# Patient Record
Sex: MALE | Race: WHITE | NOT HISPANIC OR LATINO | URBAN - METROPOLITAN AREA
[De-identification: names, ages, dates, MRNs, and addresses within clinical notes are randomized per-mention and may not be internally consistent; named-entity substitution may affect disease eponyms.]

---

## 2022-12-23 ENCOUNTER — EMERGENCY (EMERGENCY)
Facility: HOSPITAL | Age: 67
LOS: 0 days | Discharge: HOME | End: 2022-12-23
Attending: EMERGENCY MEDICINE | Admitting: EMERGENCY MEDICINE
Payer: MEDICARE

## 2022-12-23 VITALS — SYSTOLIC BLOOD PRESSURE: 160 MMHG | DIASTOLIC BLOOD PRESSURE: 93 MMHG

## 2022-12-23 VITALS
DIASTOLIC BLOOD PRESSURE: 101 MMHG | OXYGEN SATURATION: 97 % | WEIGHT: 179.9 LBS | SYSTOLIC BLOOD PRESSURE: 162 MMHG | RESPIRATION RATE: 16 BRPM | HEART RATE: 87 BPM | TEMPERATURE: 98 F

## 2022-12-23 DIAGNOSIS — Z23 ENCOUNTER FOR IMMUNIZATION: ICD-10-CM
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
## 2022-12-23 DIAGNOSIS — S63.280A DISLOCATION OF PROXIMAL INTERPHALANGEAL JOINT OF RIGHT INDEX FINGER, INITIAL ENCOUNTER: ICD-10-CM

## 2022-12-23 DIAGNOSIS — E78.5 HYPERLIPIDEMIA, UNSPECIFIED: ICD-10-CM
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
## 2022-12-23 DIAGNOSIS — M79.644 PAIN IN RIGHT FINGER(S): ICD-10-CM

## 2022-12-23 DIAGNOSIS — I10 ESSENTIAL (PRIMARY) HYPERTENSION: ICD-10-CM

## 2022-12-23 PROCEDURE — 73120 X-RAY EXAM OF HAND: CPT | Mod: 26,59,RT

## 2022-12-23 PROCEDURE — 12001 RPR S/N/AX/GEN/TRNK 2.5CM/<: CPT

## 2022-12-23 PROCEDURE — 99283 EMERGENCY DEPT VISIT LOW MDM: CPT | Mod: 25

## 2022-12-23 PROCEDURE — 73130 X-RAY EXAM OF HAND: CPT | Mod: 26,RT

## 2022-12-23 RX ORDER — CEPHALEXIN 500 MG
1 CAPSULE ORAL
Qty: 40 | Refills: 0
Start: 2022-12-23 | End: 2023-01-01

## 2022-12-23 RX ORDER — IBUPROFEN 200 MG
600 TABLET ORAL ONCE
Refills: 0 | Status: COMPLETED | OUTPATIENT
Start: 2022-12-23 | End: 2022-12-23

## 2022-12-23 RX ORDER — TETANUS TOXOID, REDUCED DIPHTHERIA TOXOID AND ACELLULAR PERTUSSIS VACCINE, ADSORBED 5; 2.5; 8; 8; 2.5 [IU]/.5ML; [IU]/.5ML; UG/.5ML; UG/.5ML; UG/.5ML
0.5 SUSPENSION INTRAMUSCULAR ONCE
Refills: 0 | Status: COMPLETED | OUTPATIENT
Start: 2022-12-23 | End: 2022-12-23

## 2022-12-23 RX ADMIN — Medication 600 MILLIGRAM(S): at 13:38

## 2022-12-23 RX ADMIN — TETANUS TOXOID, REDUCED DIPHTHERIA TOXOID AND ACELLULAR PERTUSSIS VACCINE, ADSORBED 0.5 MILLILITER(S): 5; 2.5; 8; 8; 2.5 SUSPENSION INTRAMUSCULAR at 12:30

## 2022-12-23 NOTE — ED PROVIDER NOTE - PROGRESS NOTE DETAILS
ED Attending ADWOA Reeves noted, wound being cleansed, will reduce dislocation as well after pain controlled, pt with no pain to MCP, no foreign bodies. will continue to monitor and reassess. Patient is a good candidate to attempt outpatient management. Supportive care and home care discussed in detail. Patient aware they may have to return for re-evaluation and possible admission if outpatient treatment fails. Strict return precautions discussed.

## 2022-12-23 NOTE — ED PROVIDER NOTE - CLINICAL SUMMARY MEDICAL DECISION MAKING FREE TEXT BOX
67-year-old male with past medical history of high blood pressure, hyperlipidemia, not on anticoagulation, presents with right second finger laceration after he tripped on a step and fell on his right hand, no other trauma.  Unsure of last tetanus.  Patient reports mild throbbing pain to the right second phalanx, worse with movement, better at rest, nonradiating, associated with decreased range of motion and a laceration.    No fever, chills, nausea, vomiting, numbness/tingling, decreased sensation, discharge, hearing a click/feeling a pop, or any trauma. Status post reduction of finger, tolerated well, wound cleansed extensively, sutures placed, antibiotics given.  Patient aware to follow-up with Ortho.  Signs and symptoms to return for.   will follow-up as discussed.  Patient is a good candidate to attempt outpatient management. Supportive care and home care discussed in detail. Patient aware they may have to return for re-evaluation and possible admission if outpatient treatment fails. Strict return precautions discussed.

## 2022-12-23 NOTE — ED PROVIDER NOTE - CARE PROVIDER_API CALL
Dhaval Rader)  Orthopaedic Surgery  [address] [place] [address]  Phone: [phone]  Fax: [phone]  Follow Up Time:

## 2022-12-23 NOTE — ED PROVIDER NOTE - PATIENT PORTAL LINK FT
You can access the FollowMyHealth Patient Portal offered by [facility] by registering at the following website: [email or web address]. By joining [employer]’s FollowMyHealth portal, you will also be able to view your health information using other applications (apps) compatible with our system.

## 2022-12-23 NOTE — ED PROVIDER NOTE - NSFOLLOWUPINSTRUCTIONS_ED_ALL_ED_FT
Follow up with PMD and Orthopedics in 1-2 days.    Dislocation    A dislocation is a displacement of the bones that form a joint. This can result from trauma or due to a previous weakening of that joint. Symptoms include pain, swelling, and deformity at the site. Your health care provider has performed maneuvers to place the bones back in place. If a splint or brace was applied, make sure to wear it until you see an orthopedist as instructed.     SEEK IMMEDIATE MEDICAL CARE IF YOU HAVE ANY OF THE FOLLOWING SYMPTOMS: numbness, tingling, pain, weakness, or skin color/temperature change in any part of your body distal to the injury.    Laceration    A laceration is a cut that goes through all of the layers of the skin and into the tissue that is right under the skin. Some lacerations heal on their own. Others need to be closed with stitches (sutures), staples, skin adhesive strips, or skin glue. Proper laceration care minimizes the risk of infection and helps the laceration to heal better.     SEEK IMMEDIATE MEDICAL CARE IF YOU HAVE THE FOLLOWING SYMPTOMS: swelling around the wound, worsening pain, drainage from the wound, red streaking going away from your wound, inability to move finger or toe near the laceration, or discoloration of skin near the laceration.

## 2022-12-23 NOTE — ED ADULT NURSE NOTE - NSIMPLEMENTINTERV_GEN_ALL_ED
Implemented All Universal Safety Interventions:  [place] to call system. Call bell, personal items and telephone within reach. Instruct patient to call for assistance. Room bathroom lighting operational. Non-slip footwear when patient is off stretcher. Physically safe environment: no spills, clutter or unnecessary equipment. Stretcher in lowest position, wheels locked, appropriate side rails in place.

## 2022-12-23 NOTE — ED PROVIDER NOTE - OBJECTIVE STATEMENT
67-year-old male past medical history of hypertension, hyperlipidemia presents for evaluation status post fall.  Patient states he tripped on the last step of the stairs and fell onto his right side elevated on his right second finger.  Since has mild aching pain localized to right second finger with associated deformity and overlying laceration.  Patient has not been able to range finger since injury. Pt also sustained abrasions to R hand and R knee. Denies head trauma, loc, ac, cp, sob, weakness, numbness, vomiting, diarrhea, hematuria

## 2022-12-23 NOTE — ED PROVIDER NOTE - PHYSICAL EXAMINATION
CONST: Well appearing in NAD  EYES: Sclera and conjunctiva clear.   ENT: No nasal discharge. Oropharynx normal appearing, no erythema or exudates. No abscess or swelling. Uvula midline.   NECK: Non-tender, no meningeal signs. normal ROM. supple   CARD: S1 S2; No jvd  RESP: Equal BS B/L, No wheezes, rhonchi or rales. No distress  GI: Soft, non-tender, non-distended. no cva tenderness. normal BS  MS: Deformity with decreased rom to R 2nd finger at PIP. NV intact  SKIN: Laceration to R 2nd finger  NEURO: A&Ox3, No focal deficits. Strength 5/5 with no sensory deficits. Steady gait.

## 2022-12-23 NOTE — ED PROVIDER NOTE - ATTENDING APP SHARED VISIT CONTRIBUTION OF CARE
67-year-old male with past medical history of high blood pressure, hyperlipidemia, not on anticoagulation, presents with right second finger laceration after he tripped on a step and fell on his right hand, no other trauma.  Unsure of last tetanus.  Patient reports mild throbbing pain to the right second phalanx, worse with movement, better at rest, nonradiating, associated with decreased range of motion and a laceration.   No fever, chills, nausea, vomiting, numbness/tingling, decreased sensation, discharge, hearing a click/feeling a pop, or any trauma.     on exam: WDWN non toxic appearing pt. (+) R second finger with mid swelling and laceration, no active bleeding, no tendon rupture in a blood less field, no underlying bone visualized, no erythema, induration, fluctuance no ecchymoses. No crepitus. Radial pulses 2/4 b/l. Cap refill < 2 seconds,  Good finger opposition, FROM of R wrist in flexion, extension, ulna and radial deviation. No snuff box tenderness.    FROM of R second index finger in flexion, extension at PIP, MCP, decreased at the DIP, no pain to palpation to finger pad, no pain to palpation along tendon sheath, finger not held in flexion, no fusiform swelling, pain to palpation over R mid second finger where swelling is present,  FROM of R elbow in flexion, extensions, supination and pronation, and R shoulder in flexion, extension, abduction, and adduction with no pain to palpation. GCS 15.    Plan: Pain control, R hand xray, wound cleansing, wound care, reassess.

## 2022-12-23 NOTE — ED PROVIDER NOTE - NS ED ATTENDING STATEMENT MOD
This was a shared visit with the YEE. I reviewed and verified the documentation and independently performed the documented:

## 2022-12-24 NOTE — ED PROCEDURE NOTE - CPROC ED POST PROC CARE GUIDE1
Verbal/written post procedure instructions were given to patient/caregiver./Instructed patient/caregiver to follow-up with primary care physician./Elevate the injured extremity as instructed./Keep the cast/splint/dressing clean and dry.
Verbal/written post procedure instructions were given to patient/caregiver./Instructed patient/caregiver to follow-up with primary care physician./Instructed patient/caregiver regarding signs and symptoms of infection./Keep the cast/splint/dressing clean and dry.

## 2022-12-24 NOTE — ED PROCEDURE NOTE - CPROC ED TIME OUT STATEMENT1
“Patient's name, , procedure and correct site were confirmed during the [place] Timeout.”
“Patient's name, , procedure and correct site were confirmed during the [place] Timeout.”

## 2022-12-24 NOTE — ED PROCEDURE NOTE - NS ED ATTENDING STATEMENT MOD
This was a shared visit with the YEE. I reviewed and verified the documentation and independently performed the documented:
This was a shared visit with the YEE. I reviewed and verified the documentation and independently performed the documented:

## 2023-01-05 ENCOUNTER — RESULT CHARGE (OUTPATIENT)
Age: 68
End: 2023-01-05

## 2023-01-05 ENCOUNTER — APPOINTMENT (OUTPATIENT)
Dept: ORTHOPEDIC SURGERY | Facility: CLINIC | Age: 68
End: 2023-01-05
Payer: MEDICARE

## 2023-01-05 DIAGNOSIS — S63.280A: ICD-10-CM

## 2023-01-05 PROBLEM — Z00.00 ENCOUNTER FOR PREVENTIVE HEALTH EXAMINATION: Status: ACTIVE | Noted: 2023-01-05

## 2023-01-05 PROCEDURE — 73140 X-RAY EXAM OF FINGER(S): CPT | Mod: RT

## 2023-01-05 PROCEDURE — 99203 OFFICE O/P NEW LOW 30 MIN: CPT

## 2023-01-05 NOTE — DATA REVIEWED
[FreeTextEntry1] :   X-rays reviewed on the [facility] PACS system of his right index finger show a dorsal dislocation of the PIP joint.  Post reduction x-rays show the joint anatomic alignment.\par \par   X-rays repeated in the office today also show the joint in anatomic alignment.

## 2023-01-05 NOTE — DISCUSSION/SUMMARY
[de-identified] :  The patient is about 2 weeks status post the injury.\par Today I removed his sutures.\par I discussed the case with Dr. Rader. He is a little bit hyperextended on the x-ray. \par I placed him in an alumifoam splint with 20 degrees flexion of the PIP joint. \par He will follow up in 2 weeks with Dr. Rader for repeat x-ray and evaluation. \par All questions were answered today.

## 2023-01-05 NOTE — PHYSICAL EXAM
[de-identified] : Physical exam of his right index finger:  Swelling of the PIP joint.  There is a healing wound with sutures intact.  Scabbing over the wound.  The wound is on the volar side of the PIP joint.  He cannot make a full fist.  He can flex and extend the MCP, PIP, and DIP joint of all 10 digits.

## 2023-01-05 NOTE — HISTORY OF PRESENT ILLNESS
[de-identified] :   Patient is a 67-year-old male here for evaluation of his right index finger.  He states that on 12/23/2022, he fell and dislocated his finger.  He went to [facility] where he had x-rays done.  They reduced the fracture and  sutured the laceration.\par

## 2023-01-27 ENCOUNTER — APPOINTMENT (OUTPATIENT)
Dept: ORTHOPEDIC SURGERY | Facility: CLINIC | Age: 68
End: 2023-01-27
Payer: MEDICARE

## 2023-01-27 VITALS — BODY MASS INDEX: 24.38 KG/M2 | HEIGHT: 72 IN | WEIGHT: 180 LBS

## 2023-01-27 PROCEDURE — 99213 OFFICE O/P EST LOW 20 MIN: CPT

## 2023-01-27 PROCEDURE — 73140 X-RAY EXAM OF FINGER(S): CPT | Mod: RT

## 2023-01-27 NOTE — PHYSICAL EXAM
[de-identified] : Patient is removed from splint.  He has congruency at the PIP joint he has stiffness of the fingers.  He has stiffness of the index finger over the PIP and DIP joint sensation.

## 2023-01-27 NOTE — HISTORY OF PRESENT ILLNESS
[de-identified] : 67-year-old male had a right index finger PIP dislocation.  He was reduced and splinted comes in today for evaluation he has no significant complaints at home.

## 2023-01-27 NOTE — ASSESSMENT
[FreeTextEntry1] : Patient had dislocation of the right index finger PIP joint he will start range of motion exercise and stretching exercises of the finger did give him prescription for therapy if he does not regain his motion back quickly he should go to formal therapy he will see us back in 3 weeks with a repeat evaluation for range of motion.

## 2023-01-27 NOTE — DATA REVIEWED
[FreeTextEntry1] : Radiographs 3 views of the right index finger show a congruent PIP joint.  Small chip fracture off the volar aspect of the middle phalanx

## 2023-02-16 ENCOUNTER — APPOINTMENT (OUTPATIENT)
Dept: ORTHOPEDIC SURGERY | Facility: CLINIC | Age: 68
End: 2023-02-16
Payer: MEDICARE

## 2023-02-16 VITALS — HEIGHT: 72 IN | BODY MASS INDEX: 24.38 KG/M2 | WEIGHT: 180 LBS

## 2023-02-16 PROCEDURE — 99213 OFFICE O/P EST LOW 20 MIN: CPT

## 2023-02-16 NOTE — PHYSICAL EXAM
[de-identified] : Physical exam of his right index finger: Swelling of the PIP joint.  Negative ecchymosis.  Nontender over the MCP joint.  Pain of the PIP joint.  Nontender over the DIP joint.  He cannot make a full fist.  He can flex and extend at the MCP, PIP, and DIP joint of all 10 digits.  His sensory and motor are intact.

## 2023-02-16 NOTE — HISTORY OF PRESENT ILLNESS
[de-identified] : Patient is a 67-year-old male here for repeat evaluation of his right index finger.  He is about 8 weeks status post a right index finger PIP joint dislocation.  He has been doing therapy, but only 1 time a week.  He is also doing things on his own.  He still has very limited range of motion of the finger with some residual pain and swelling.

## 2023-02-16 NOTE — DISCUSSION/SUMMARY
[de-identified] : He is about 8 weeks status post the injury.\par He understands that swelling can linger for several months and anything left after 6 months is typically permanent.\par He states that he can only go to therapy once a week.  He is doing things on his own as well.\par I encouraged him to work on range of motion of the finger.\par He will see Dr. Rader again in another 4 to 6 weeks.  All questions were answered.

## 2023-04-10 ENCOUNTER — APPOINTMENT (OUTPATIENT)
Dept: ORTHOPEDIC SURGERY | Facility: CLINIC | Age: 68
End: 2023-04-10
Payer: MEDICARE

## 2023-04-10 VITALS — BODY MASS INDEX: 24.38 KG/M2 | HEIGHT: 72 IN | WEIGHT: 180 LBS

## 2023-04-10 DIAGNOSIS — S63.280D: ICD-10-CM

## 2023-04-10 PROCEDURE — 99213 OFFICE O/P EST LOW 20 MIN: CPT

## 2023-04-10 NOTE — HISTORY OF PRESENT ILLNESS
[de-identified] : 67-year-old male status post right index finger dislocation.  He is in today for evaluation and he is stiff in flexion he has good extension of the finger.  Been doing therapy he says when he does the therapy he gets improved range of motion.

## 2023-04-10 NOTE — PHYSICAL EXAM
[de-identified] : Patient has mild swelling to the finger.  He has intact flexor tendon function.  He has stiffness of the finger normal sensation normal cap refill there is no erythema ecchymoses or abrasions.

## 2023-04-10 NOTE — ASSESSMENT
[FreeTextEntry1] : Patient had dislocation of his right index finger PIP joint.  [place] with range of motion exercise and stretching exercises we will continue with the therapy program he may be taken a trip to [place] which would actually help his motion of discussed this with him.  No need for further follow-up at this time continue to follow-up with therapy and home exercise program

## 2023-05-30 NOTE — ED PROCEDURE NOTE - CPROC ED FRACTUR REDUC DETAIL1
The anatomic location of the fracture was identified, and patient was properly positioned. Using the appropriate technique, fracture reduction was performed. Patient was placed in an immobilizer/splint. Additional Complaints

## 2023-06-23 ENCOUNTER — EMERGENCY (EMERGENCY)
Facility: HOSPITAL | Age: 68
LOS: 0 days | Discharge: HOME | End: 2023-06-23
Attending: EMERGENCY MEDICINE
Payer: MEDICARE

## 2023-06-23 VITALS
RESPIRATION RATE: 19 BRPM | WEIGHT: 175.05 LBS | SYSTOLIC BLOOD PRESSURE: 122 MMHG | DIASTOLIC BLOOD PRESSURE: 76 MMHG | HEART RATE: 94 BPM | HEIGHT: 72 IN | OXYGEN SATURATION: 98 % | TEMPERATURE: 98 F

## 2023-06-23 DIAGNOSIS — I10 ESSENTIAL (PRIMARY) HYPERTENSION: ICD-10-CM

## 2023-06-23 DIAGNOSIS — R04.0 EPISTAXIS: ICD-10-CM

## 2023-06-23 DIAGNOSIS — Z79.82 LONG TERM (CURRENT) USE OF ASPIRIN: ICD-10-CM

## 2023-06-23 DIAGNOSIS — E78.5 HYPERLIPIDEMIA, UNSPECIFIED: ICD-10-CM

## 2023-06-23 PROCEDURE — 99282 EMERGENCY DEPT VISIT SF MDM: CPT

## 2023-06-23 PROCEDURE — 99283 EMERGENCY DEPT VISIT LOW MDM: CPT

## 2023-06-23 NOTE — ED PROVIDER NOTE - NSFOLLOWUPINSTRUCTIONS_ED_ALL_ED_FT
Nosebleed    WHAT YOU NEED TO KNOW:    A nosebleed, or epistaxis, occurs when one or more of the blood vessels in your nose break. You may have dark or bright red blood from one or both nostrils. A nosebleed is most commonly caused by dry air or picking your nose. A direct blow to your nose, irritation from a cold or allergies, or a foreign object can also cause a nosebleed.     DISCHARGE INSTRUCTIONS:    Return to the emergency department if:     Your nasal packing is soaked with blood.      Your nose is still bleeding after 20 minutes, even after you pinch it.       You have a foul-smelling discharge coming out of your nose.      You feel so weak and dizzy that you have trouble standing up.      You have trouble breathing or talking.     Contact your healthcare provider if:     You have a fever and are vomiting.      You have pain in and around your nose that is getting worse even after you take pain medicines.      Your nasal pack is loose.      You have questions or concerns about your condition or care.    First aid:     Sit up and lean forward. This will help prevent you from swallowing blood. Spit blood and saliva into a bowl.       Apply pressure to your nose. Use 2 fingers to pinch your nose shut for 10 to 15 minutes. This will help stop the bleeding. Breathe through your mouth.            Apply ice on the bridge of your nose to decrease swelling and bleeding. Use a cold pack or put crushed ice in a plastic bag. Cover it with a towel to protect your skin.      Pack your nose with a cotton ball, tissue, tampon, or gauze bandage to stop the bleeding.    Medicines:     Medicines applied to a small piece of cotton and placed in your nose. Medicine may also be sprayed in or applied directly to your nose. You may need medicine to prevent an infection. If bleeding is severe, medicine may be injected into a blood vessel in your nose.       Take your medicine as directed. Contact your healthcare provider if you think your medicine is not helping or if you have side effects. Tell him of her if you are allergic to any medicine. Keep a list of the medicines, vitamins, and herbs you take. Include the amounts, and when and why you take them. Bring the list or the pill bottles to follow-up visits. Carry your medicine list with you in case of an emergency.    Prevent another nosebleed:     Keep your nose moist. Put a small amount of petroleum jelly inside your nostrils as needed. Use a saline (saltwater) nasal spray. Do not put anything else inside your nose unless your healthcare provider says it is okay. Do not use oil-based lubricants if you use oxygen therapy. They may be flammable.      Use a cool mist humidifier to increase air moisture in your home. This will help your nose stay moist.       Do not pick or blow your nose for at least a week. You can irritate or damage your nose if you pick it. Blowing your nose too hard may cause the bleeding to start again. Do not bend over or strain as this can cause the bleeding to start again.      Avoid irritants such as tobacco smoke or chemical sprays such as .    Follow up with your healthcare provider as directed: Any packing in your nose should be removed within 2 to 3 days. Write down your questions so you remember to ask them during your visits.        © Copyright [employer] 2019 All illustrations and images included in CareNotes are the copyrighted property of [employer] or [employer].

## 2023-06-23 NOTE — ED PROVIDER NOTE - OBJECTIVE STATEMENT
67 year old male, past medical history htn, hdl, on asa, who presents with epistaxis. patient had epistaxis to right nare that occurred yesterday and self-resolved. denies f/c, chest pain, shortness of breath, syncope.

## 2023-06-23 NOTE — ED ADULT TRIAGE NOTE - CHIEF COMPLAINT QUOTE
Pt presenting to ED c/o nose bleed on and off since yesterday. Nose is currently not bleeding in triage

## 2023-06-23 NOTE — ED PROVIDER NOTE - CARE PROVIDER_API CALL
Jorden Hernandez  Otolaryngology  [address] [place] [id number]  Phone: [phone]  Fax: [phone]  Follow Up Time: 1-3 Days

## 2023-06-23 NOTE — ED PROVIDER NOTE - PHYSICAL EXAMINATION
CONSTITUTIONAL: Well-developed; well-nourished; in no acute distress, nontoxic appearing  SKIN: skin exam is warm and dry  ENT: dired blood to anterior right nare, no active bleeding, oropharynx non-erythematous, uvula midline, tolerating secretions   CARD: S1, S2 normal, no murmur  RESP: No wheezes, rales or rhonchi. Good air movement bilaterally  EXT: Normal ROM.   NEURO: awake, alert, following commands, oriented, grossly unremarkable. No Focal deficits. GCS 15.   PSYCH: Cooperative, appropriate.

## 2023-06-23 NOTE — ED PROVIDER NOTE - PATIENT PORTAL LINK FT
You can access the FollowMyHealth Patient Portal offered by [facility] by registering at the following website: [email or web address]. By joining [employer]’s FollowMyHealth portal, you will also be able to view your health information using other applications (apps) compatible with our system.

## 2023-06-23 NOTE — ED PROVIDER NOTE - ATTENDING APP SHARED VISIT CONTRIBUTION OF CARE
Patient states that, was having nose bleeding, which had stopped spontaneously. Patient is asymptomatic in ED.   vitals reviewed.   Lungs: CTA, no wheezing, no crackles.  A/P: Epistaxis, stopped spontaneously,   close outpatient follow up.

## 2023-06-26 ENCOUNTER — EMERGENCY (EMERGENCY)
Facility: HOSPITAL | Age: 68
LOS: 0 days | Discharge: HOME | End: 2023-06-26
Attending: EMERGENCY MEDICINE
Payer: MEDICARE

## 2023-06-26 VITALS
HEART RATE: 101 BPM | DIASTOLIC BLOOD PRESSURE: 93 MMHG | WEIGHT: 169.98 LBS | OXYGEN SATURATION: 95 % | SYSTOLIC BLOOD PRESSURE: 137 MMHG | HEIGHT: 72 IN | RESPIRATION RATE: 18 BRPM

## 2023-06-26 VITALS
DIASTOLIC BLOOD PRESSURE: 71 MMHG | OXYGEN SATURATION: 96 % | RESPIRATION RATE: 16 BRPM | SYSTOLIC BLOOD PRESSURE: 114 MMHG | HEART RATE: 82 BPM

## 2023-06-26 DIAGNOSIS — R04.0 EPISTAXIS: ICD-10-CM

## 2023-06-26 LAB
ALBUMIN SERPL ELPH-MCNC: 4.3 G/DL — SIGNIFICANT CHANGE UP (ref 3.5–5.2)
ALP SERPL-CCNC: 74 U/L — SIGNIFICANT CHANGE UP (ref 30–115)
ALT FLD-CCNC: 14 U/L — SIGNIFICANT CHANGE UP (ref 0–41)
ANION GAP SERPL CALC-SCNC: 11 MMOL/L — SIGNIFICANT CHANGE UP (ref 7–14)
AST SERPL-CCNC: 16 U/L — SIGNIFICANT CHANGE UP (ref 0–41)
BASOPHILS # BLD AUTO: 0.07 K/UL — SIGNIFICANT CHANGE UP (ref 0–0.2)
BASOPHILS NFR BLD AUTO: 0.9 % — SIGNIFICANT CHANGE UP (ref 0–1)
BILIRUB SERPL-MCNC: 1.4 MG/DL — HIGH (ref 0.2–1.2)
BUN SERPL-MCNC: 24 MG/DL — HIGH (ref 10–20)
CALCIUM SERPL-MCNC: 9.3 MG/DL — SIGNIFICANT CHANGE UP (ref 8.4–10.5)
CHLORIDE SERPL-SCNC: 105 MMOL/L — SIGNIFICANT CHANGE UP (ref 98–110)
CO2 SERPL-SCNC: 22 MMOL/L — SIGNIFICANT CHANGE UP (ref 17–32)
CREAT SERPL-MCNC: 0.8 MG/DL — SIGNIFICANT CHANGE UP (ref 0.7–1.5)
EGFR: 96 ML/MIN/1.73M2 — SIGNIFICANT CHANGE UP
EOSINOPHIL # BLD AUTO: 0.48 K/UL — SIGNIFICANT CHANGE UP (ref 0–0.7)
EOSINOPHIL NFR BLD AUTO: 6 % — SIGNIFICANT CHANGE UP (ref 0–8)
GLUCOSE SERPL-MCNC: 113 MG/DL — HIGH (ref 70–99)
HCT VFR BLD CALC: 43.4 % — SIGNIFICANT CHANGE UP (ref 42–52)
HGB BLD-MCNC: 14.8 G/DL — SIGNIFICANT CHANGE UP (ref 14–18)
IMM GRANULOCYTES NFR BLD AUTO: 0.1 % — SIGNIFICANT CHANGE UP (ref 0.1–0.3)
LYMPHOCYTES # BLD AUTO: 2.04 K/UL — SIGNIFICANT CHANGE UP (ref 1.2–3.4)
LYMPHOCYTES # BLD AUTO: 25.4 % — SIGNIFICANT CHANGE UP (ref 20.5–51.1)
MCHC RBC-ENTMCNC: 30 PG — SIGNIFICANT CHANGE UP (ref 27–31)
MCHC RBC-ENTMCNC: 34.1 G/DL — SIGNIFICANT CHANGE UP (ref 32–37)
MCV RBC AUTO: 88 FL — SIGNIFICANT CHANGE UP (ref 80–94)
MONOCYTES # BLD AUTO: 0.66 K/UL — HIGH (ref 0.1–0.6)
MONOCYTES NFR BLD AUTO: 8.2 % — SIGNIFICANT CHANGE UP (ref 1.7–9.3)
NEUTROPHILS # BLD AUTO: 4.77 K/UL — SIGNIFICANT CHANGE UP (ref 1.4–6.5)
NEUTROPHILS NFR BLD AUTO: 59.4 % — SIGNIFICANT CHANGE UP (ref 42.2–75.2)
NRBC # BLD: 0 /100 WBCS — SIGNIFICANT CHANGE UP (ref 0–0)
PLATELET # BLD AUTO: 205 K/UL — SIGNIFICANT CHANGE UP (ref 130–400)
PMV BLD: 11 FL — HIGH (ref 7.4–10.4)
POTASSIUM SERPL-MCNC: 4.6 MMOL/L — SIGNIFICANT CHANGE UP (ref 3.5–5)
POTASSIUM SERPL-SCNC: 4.6 MMOL/L — SIGNIFICANT CHANGE UP (ref 3.5–5)
PROT SERPL-MCNC: 7 G/DL — SIGNIFICANT CHANGE UP (ref 6–8)
RBC # BLD: 4.93 M/UL — SIGNIFICANT CHANGE UP (ref 4.7–6.1)
RBC # FLD: 13.9 % — SIGNIFICANT CHANGE UP (ref 11.5–14.5)
SODIUM SERPL-SCNC: 138 MMOL/L — SIGNIFICANT CHANGE UP (ref 135–146)
WBC # BLD: 8.03 K/UL — SIGNIFICANT CHANGE UP (ref 4.8–10.8)
WBC # FLD AUTO: 8.03 K/UL — SIGNIFICANT CHANGE UP (ref 4.8–10.8)

## 2023-06-26 PROCEDURE — 80053 COMPREHEN METABOLIC PANEL: CPT

## 2023-06-26 PROCEDURE — 99284 EMERGENCY DEPT VISIT MOD MDM: CPT

## 2023-06-26 PROCEDURE — 85025 COMPLETE CBC W/AUTO DIFF WBC: CPT

## 2023-06-26 PROCEDURE — 36415 COLL VENOUS BLD VENIPUNCTURE: CPT

## 2023-06-26 PROCEDURE — 99283 EMERGENCY DEPT VISIT LOW MDM: CPT

## 2023-06-26 RX ORDER — OXYMETAZOLINE HYDROCHLORIDE 0.5 MG/ML
2 SPRAY NASAL ONCE
Refills: 0 | Status: DISCONTINUED | OUTPATIENT
Start: 2023-06-26 | End: 2023-06-26

## 2023-06-26 NOTE — ED PROVIDER NOTE - OBJECTIVE STATEMENT
67-year-old male presents to the ED for evaluation of epistaxis.  Patient states he has had 3 episodes over the last couple of days.

## 2023-06-26 NOTE — ED PROVIDER NOTE - ATTENDING APP SHARED VISIT CONTRIBUTION OF CARE
Epistaxis resolved in ED with direct pressure, observed, no rebleed, posterior oropharynx clear, will discharge with ENT follow-up, means of hemostasis demonstrated to patient, counseled regarding conditions which should prompt return.

## 2023-06-26 NOTE — ED PROVIDER NOTE - PROGRESS NOTE DETAILS
The patient held pressure bleeding ceased.  Afrin used.  Nasal cavity cleared of clots.  Exam and could not find an area to cauterize.  We will continue to observe Results of blood discussed with patient patient has had no bleeding.  Will DC patient with ENT follow-up

## 2023-06-26 NOTE — ED PROVIDER NOTE - PHYSICAL EXAMINATION
--EXAM--  VITAL SIGNS: I have reviewed vs documented at present.  CONSTITUTIONAL: Well-developed; well-nourished; in no acute distress.   SKIN: Warm and dry, no acute rash.   HEAD: Normocephalic; atraumatic.  EYES: PERRL, EOM intact; conjunctiva and sclera clear. No nystagmus.  ENT: Right nostril small amount of blood oozing

## 2023-06-26 NOTE — ED PROVIDER NOTE - PATIENT PORTAL LINK FT
You can access the FollowMyHealth Patient Portal offered by [facility] by registering at the following website: [email or web address]. By joining [employer]’s FollowMyHealth portal, you will also be able to view your health information using other applications (apps) compatible with our system.

## 2023-06-26 NOTE — ED PROVIDER NOTE - MDM PATIENT STATEMENT FOR ADDL TREATMENT
Patient with one or more new problems requiring additional work-up/treatment.
<<-----Click here for Discharge Medication Review

## 2023-06-26 NOTE — ED ADULT NURSE NOTE - NSFALLUNIVINTERV_ED_ALL_ED
Bed/Stretcher in lowest position, wheels locked, appropriate side rails in place/Call bell, personal items and telephone in reach/Instruct patient to call for assistance before getting out of bed/chair/stretcher/Non-slip footwear applied when patient is off stretcher/[place] to call system/Physically safe environment - no spills, clutter or unnecessary equipment/Purposeful proactive rounding/Room/bathroom lighting operational, light cord in reach

## 2023-08-24 NOTE — ED PROVIDER NOTE - CARE PLAN
Pt stated she stop taking her Metoprolol in the morning 3 days ago due to it causing diarrhea and severe cramping. She still takes it in the evening.     Her BP reading today were 121/71 @8:45am  135/85 @11:45am and 136/74@ 3:15pm    Pt would like to know if its ok to stop the medication in the morning     Principal Discharge DX:	Epistaxis   1

## 2023-09-06 ENCOUNTER — APPOINTMENT (OUTPATIENT)
Dept: OTOLARYNGOLOGY | Facility: CLINIC | Age: 68
End: 2023-09-06